# Patient Record
Sex: MALE | Race: WHITE | Employment: PART TIME | URBAN - METROPOLITAN AREA
[De-identification: names, ages, dates, MRNs, and addresses within clinical notes are randomized per-mention and may not be internally consistent; named-entity substitution may affect disease eponyms.]

---

## 2017-09-18 ENCOUNTER — TRANSFERRED RECORDS (OUTPATIENT)
Dept: HEALTH INFORMATION MANAGEMENT | Facility: CLINIC | Age: 32
End: 2017-09-18

## 2017-09-19 ENCOUNTER — TRANSFERRED RECORDS (OUTPATIENT)
Dept: HEALTH INFORMATION MANAGEMENT | Facility: CLINIC | Age: 32
End: 2017-09-19

## 2017-09-20 ENCOUNTER — TRANSFERRED RECORDS (OUTPATIENT)
Dept: HEALTH INFORMATION MANAGEMENT | Facility: CLINIC | Age: 32
End: 2017-09-20

## 2017-09-22 ENCOUNTER — TRANSFERRED RECORDS (OUTPATIENT)
Dept: HEALTH INFORMATION MANAGEMENT | Facility: CLINIC | Age: 32
End: 2017-09-22

## 2017-09-27 ENCOUNTER — TELEPHONE (OUTPATIENT)
Dept: TRANSPLANT | Facility: CLINIC | Age: 32
End: 2017-09-27

## 2017-09-27 ENCOUNTER — APPOINTMENT (OUTPATIENT)
Dept: TRANSPLANT | Facility: CLINIC | Age: 32
End: 2017-09-27
Attending: PHYSICIAN ASSISTANT

## 2017-09-27 NOTE — LETTER
09/27/17    Harrison Lipscomb  [address] [address]          Dear Harrison,    Thank you for your interest in the Transplant Center at [facility], [facility]. We look forward to being a part your care team and assisting you through the transplant process.    As we discussed, your transplant coordinator is Lisa Jay ([phone]).  You may call your coordinator at any time with questions or concerns. Your first scheduled call will be on October 10th between 10am and Noon, and the TENTATIVE date for your evaluation is October 30th at 6:30AM.  If this needs to change, call [phone].    Please complete the following.    1. Sign up for:    [employer], your electronic medical record    [employer], the Transplant Center's website (see enclosed booklet)    You can use these tools to learn more about your transplant, communicate with your care team, and track your medical details    2. Fill out and return the enclosed forms    Authorization for Electronic Communication    Authorization to Discuss Protected Health Information    [employer] Release of Information       Best Wishes,      Solid Organ Transplant Intake  [facility], [facility]    cc: YUKI Slade MD Elizabeth A Phillips, MD

## 2017-09-28 NOTE — TELEPHONE ENCOUNTER
Irais called asking if I had any demographic INS information?    I called Justin Louie, nephrologist office and Requested Demographic page with INS info from [facility] where Harrison Lipscomb is currently inpt.

## 2017-10-03 ENCOUNTER — MEDICAL CORRESPONDENCE (OUTPATIENT)
Dept: TRANSPLANT | Facility: CLINIC | Age: 32
End: 2017-10-03

## 2017-10-09 ENCOUNTER — MEDICAL CORRESPONDENCE (OUTPATIENT)
Dept: TRANSPLANT | Facility: CLINIC | Age: 32
End: 2017-10-09

## 2017-10-10 NOTE — TELEPHONE ENCOUNTER
I asked Pamela /Julia to cancel the 10/30 block evaluation until INS.  Irais, What INS do I send his information to?    I called LM with mother to call back.    I called Carlitos father cell phone and left voice mail to call back.  I called Justin Louie [phone]  Called to verify where Mr. Harrison Lipscomb referred for kidney transplant, [place] or [facility].  Does Dr. Louie feel he is a transplant candidate?       Pattie Mother and I talked,  Rubi Anthony MD  New Nephrologist [place] Kid[facility] Disease HTN [address] [address] [phone]   Friday 13, 2017 first appt  Contacted patient and introduced myself as their Transplant Coordinator, also introduced the role of the Transplant Coordinator in the transplant process.  Explained the purpose of this call including reviewing next steps and answering questions.    Confirmed Referring Provider, Dialysis Center, and Primary Care Physician. Notified patient of the importance of continued communication with referring providers and primary care physicians.    Reviewed components of transplant evaluation process including necessary appointments, tests, and procedures.    Answered questions for patient regarding evaluation, provided my name and contact information and requested they call with any additional questions.    Determined that patient would like additional information regarding transplant by:  Mat, Phone Call Encourage Mat   Notified mother Pattie,  that they will talk with INS  Irais Lira first to determine coverage at [facility] first.  Once Irais give authorization, Lisa to Place Epic Block kidney records then Julia a Transplant  to schedule evaluation for November 1, 2017.    Lisa called Urology triage spoke with CHARO Atkinson  to attempt appt with urologist week of October 30-November 3rd for second opinion.  [place] Records and [facility]

## 2017-10-11 ENCOUNTER — PRE VISIT (OUTPATIENT)
Dept: UROLOGY | Facility: CLINIC | Age: 32
End: 2017-10-11

## 2017-10-11 DIAGNOSIS — N18.5 CHRONIC KIDNEY DISEASE (CKD), STAGE V (H): Primary | ICD-10-CM

## 2017-10-11 DIAGNOSIS — Z76.82 ORGAN TRANSPLANT CANDIDATE: ICD-10-CM

## 2017-10-12 NOTE — TELEPHONE ENCOUNTER
Called pt lvm that I was scheduling kidney eval for Wed 11/1 slot 4 w/Chris/Matas, UPSing eval out tonight.  Urol appt is sched for 11/2 at 720am w/C Weight

## 2017-10-20 ENCOUNTER — PRE VISIT (OUTPATIENT)
Dept: UROLOGY | Facility: CLINIC | Age: 32
End: 2017-10-20

## 2017-10-24 ENCOUNTER — TELEPHONE (OUTPATIENT)
Dept: TRANSPLANT | Facility: CLINIC | Age: 32
End: 2017-10-24

## 2017-10-24 NOTE — TELEPHONE ENCOUNTER
Pattie Lipscomb, Mom called to confirm appts in Epic.  I forwarded VM to Julia as joaquín.  How can Pattie/ Mom verify the appts on line in Epic?  I call her back and informed her the scheduled was mailed out via UPS and did she /he receive it?  joaquín Dumont

## 2017-10-30 ENCOUNTER — TELEPHONE (OUTPATIENT)
Dept: TRANSPLANT | Facility: CLINIC | Age: 32
End: 2017-10-30

## 2017-10-30 NOTE — TELEPHONE ENCOUNTER
Harrison [place]Ayaka in [place] three appts locally in [place] this week, Too taxing for Harrison to come to [facility] at this time.  Harrison /Ayaka canceling Block eval for 11/1 & 11/2.   Ayaka, Harrison's mother, to call when they are ready to reschedule.  cruzito Tran, Benjamin Roche MD;Luther Angulo Samy Riad, MD, Irais Tran to cancel appts.  [place] in Urology got ok from Dr. Alethea gonzalez to see pt this week.  Chelsea please inform [place], thanks and unfortanately they cancelled.

## 2018-01-03 ENCOUNTER — TELEPHONE (OUTPATIENT)
Dept: TRANSPLANT | Facility: CLINIC | Age: 33
End: 2018-01-03

## 2018-01-03 NOTE — TELEPHONE ENCOUNTER
1/2/2108  Chelsea,  I called and reached person/Melisa /mother  and 'they' will call us in 3-6 months when they are ready.   I offered to close the file, but sounded like Harrison's mother asking to keep his case open, but they will call us. You are supported for not calling, we will await their call to arrange the evaluation. Lisa       From: Chelsea Portillo   Sent: 1/3/2018  12:29 PM   To: Lisa Jay, CHARO  Subject:  Evaluation   Chelsea Called pt at home number wouldn't let me leave a voice message, I than tried cell phone and that also will not take a message.  I quit on this pt.  ----- Message -----     From: Lisa Jay RN Sent: 12/14/2017   1:14 PM To: Chelsea Portillo  Subject: Patient willing to come?   Jesse Ho,  Previous note in Epic Harrison was not well enough to come to clinic appt.  Would you be willing to call him and recheck if he is willing to come to have block.  Lisa Terry /Ayaka in [place] three appts locally in [place] this week, Too taxing for Harrison to come to [facility] at this time.  Harrison /Ayaka canceling Block eval for 11/1 & 11/2.   Harrison Marley's mother, to call when they are ready to reschedule.  cruzito Tran, Benjamin Roche MD;Luther Angulo, Mao Perez MD, Irais Tran to cancel appts.  [place] in Urology got ok from Dr. Alethea gonzalez to see pt this week.  Chelsea please inform [place], thanks and unfortanately they cancelled.    Created by  Lisa Jay, RN on 10/30/2017 11:10 AM

## 2018-08-13 NOTE — TELEPHONE ENCOUNTER
"$ case review: \"This patient's insurance is an HMO Medicare Advantage Plan from [place] where they live and I believe are already listed.   If the mom/patient call again with a new referral and indicate they want to pursue transplant here - $  will review in future. \"  The referral is closed/ended.   Referral ended. Reason:  We were unable to contact patient  Episode resolved 8/13/2018  Note to Irais   "

## 2018-11-27 ENCOUNTER — TELEPHONE (OUTPATIENT)
Dept: TRANSPLANT | Facility: CLINIC | Age: 33
End: 2018-11-27

## 2018-11-27 NOTE — TELEPHONE ENCOUNTER
Mother Melisa called left a voice message to state Harrison Lipscomb is ready to do a kidney evaluation.  She asked for a call back.    I avani called and LM with Melisa asking Harrison or Melisa to call int INTAKE staff at [phone] option [phone] to restart the process for Kidney transplant

## 2018-11-28 ENCOUNTER — TELEPHONE (OUTPATIENT)
Dept: TRANSPLANT | Facility: CLINIC | Age: 33
End: 2018-11-28

## 2018-11-28 NOTE — TELEPHONE ENCOUNTER
Patient Call: General    Reason for call: mother of the patient calling SOT needs the intake process started.    Call back needed? Yes    Return Call Needed  Same as documented in contacts section  When to return call?: Greater than one day: Route standard priority

## 2018-12-19 ENCOUNTER — TELEPHONE (OUTPATIENT)
Dept: TRANSPLANT | Facility: CLINIC | Age: 33
End: 2018-12-19

## 2018-12-19 NOTE — TELEPHONE ENCOUNTER
Harrison Vincent's mother called to discuss INS.     Harrison has [employer] Medicare plan through 12/31/2018 then [employer] 1/1/2019.     Harrison reportedly has five live willing kidney donors in [place] and would like to start the Evaluation process as soon as possible.     I informed Melisa that Jax Hamptonanan is $  and he will want the INS information and once INS is secured from [facility] I can call Harrison to start the evaluation process.    Melisa has made multiple calls and glad to find the answers on how to do the process.      Melisa states she has worked with other programs and I/ we at [facility] have been curteous, helpful and knowledgeable.  She appreciates the care. I thanked her for the positive comments.     I gave Ms. Deisi Camara's number and connected her with him or his voice mail to discuss INS.

## 2019-01-07 ENCOUNTER — TELEPHONE (OUTPATIENT)
Dept: TRANSPLANT | Facility: CLINIC | Age: 34
End: 2019-01-07

## 2019-01-07 NOTE — TELEPHONE ENCOUNTER
Melisa mother called back to discuss the INS and evaluation at [facility] and potential dates to come on M orW.     I reviewed Jax Bass's OK for EVAL;  Lisa will need to send full records after EVAL to [place] for PA.      Pt is on dialysis it was NOT listed /entered into the epic snapshot page by INTAKE.    Pt dialyzes in [facility]  At [facility] unit.    Mom gave the following name, address and phone number.     [facility]  [address] [address]  [facility] [address]  Phone [phone]  [phone]    Start DATE of Dialysis  10/28/2016   Dialysis unit to fax  [phone] form attn Lisa     3X week currently  T, Th, Sa,     [phone] Harrison     Dialysis Manager Nahomy Bangtt to call INTAKE staff to start the referral.  Harrison to give the INTAKE Staff ok to speak with Mom for cares  2/13 Date for Evaluation given to Pattie ( need to confirm and write orders after intake staff call completed.  Pattie aware.Harrison, Pattie and Lisa to have three way conversation.  Harrison lives in [place] and is in chronic discomfort with dialysis.    Pattie and Harrison to discuss symptoms with their dialysis manager and nephrologist.     Lisa to talk with Harrison and Mom Pattie Santanagisel. Contacted patient and introduced myself as their Transplant Coordinator, also introduced the role of the Transplant Coordinator in the transplant process.  Explained the purpose of this call including reviewing next steps and answering questions.    Confirmed Referring Provider, Dialysis Center, and Primary Care Physician. Notified patient of the importance of continued communication with referring providers and primary care physicians.    Reviewed components of transplant evaluation process including necessary appointments, tests, and procedures.    Answered questions for patient regarding evaluation, provided my name and contact information and requested they call with any additional questions.    Determined that  patient would like additional information regarding transplant by:  Drop Down choices: Mail, Email, MyChart, Phone Call Encourage MyChart   Notified patients that they will hear from a Transplant  to schedule evaluation.    KASIA 2/13/2019 Wednesday

## 2019-01-14 ENCOUNTER — DOCUMENTATION ONLY (OUTPATIENT)
Dept: TRANSPLANT | Facility: CLINIC | Age: 34
End: 2019-01-14

## 2019-01-16 DIAGNOSIS — Z76.82 ORGAN TRANSPLANT CANDIDATE: ICD-10-CM

## 2019-01-16 DIAGNOSIS — N18.6 END STAGE RENAL DISEASE (H): ICD-10-CM

## 2019-01-21 ENCOUNTER — DOCUMENTATION ONLY (OUTPATIENT)
Dept: TRANSPLANT | Facility: CLINIC | Age: 34
End: 2019-01-21

## 2019-01-29 ENCOUNTER — DOCUMENTATION ONLY (OUTPATIENT)
Dept: TRANSPLANT | Facility: CLINIC | Age: 34
End: 2019-01-29

## 2019-01-29 NOTE — PROGRESS NOTES
Harrison Lipscomb signed [facility] evaluation ROXY and I sent to admin staff to request evaluation from [place] either [place] or in [place].     Marilee has the ROXY and will send to [facility] scanning after sending request for records.

## 2019-02-12 ENCOUNTER — TELEPHONE (OUTPATIENT)
Dept: TRANSPLANT | Facility: CLINIC | Age: 34
End: 2019-02-12

## 2019-02-12 NOTE — TELEPHONE ENCOUNTER
Melisa Delmonica called from [place] asking about their [employer] flight and evaluation for 2/13/2019.    Ricardo offering to pay for flight later.  I suggested she/he take the offer as the conditions in [facility] are snowy and more to come this afternoon.    Melisa will call Chelsea to reschedule at later time.    NILO PKE to be cancelled for 2/13/2019, due to weather and flight from [place] to [place] concerns.     Luther John, Aziza Gomez Candace.

## 2019-02-21 NOTE — TELEPHONE ENCOUNTER
Called patient to reschedule kidney eval, he stated that he was going to go to [facility] for eval and he didn't think he would be coming here.

## 2019-03-18 ENCOUNTER — APPOINTMENT (RX ONLY)
Dept: URBAN - METROPOLITAN AREA CLINIC 167 | Facility: CLINIC | Age: 34
Setting detail: DERMATOLOGY
End: 2019-03-18

## 2019-03-18 DIAGNOSIS — B07.8 OTHER VIRAL WARTS: ICD-10-CM

## 2019-03-18 PROBLEM — D48.5 NEOPLASM OF UNCERTAIN BEHAVIOR OF SKIN: Status: ACTIVE | Noted: 2019-03-18

## 2019-03-18 PROBLEM — Z92.3 PERSONAL HISTORY OF IRRADIATION: Status: ACTIVE | Noted: 2019-03-18

## 2019-03-18 PROCEDURE — 11102 TANGNTL BX SKIN SINGLE LES: CPT | Mod: 59

## 2019-03-18 PROCEDURE — 17110 DESTRUCTION B9 LES UP TO 14: CPT

## 2019-03-18 PROCEDURE — 11103 TANGNTL BX SKIN EA SEP/ADDL: CPT

## 2019-03-18 PROCEDURE — ? BIOPSY BY SHAVE METHOD

## 2019-03-18 PROCEDURE — ? LIQUID NITROGEN

## 2019-03-18 ASSESSMENT — LOCATION ZONE DERM
LOCATION ZONE: TRUNK
LOCATION ZONE: FEET

## 2019-03-18 ASSESSMENT — LOCATION SIMPLE DESCRIPTION DERM
LOCATION SIMPLE: RIGHT PLANTAR SURFACE
LOCATION SIMPLE: LOWER BACK
LOCATION SIMPLE: LEFT LOWER BACK

## 2019-03-18 ASSESSMENT — LOCATION DETAILED DESCRIPTION DERM
LOCATION DETAILED: LEFT INFERIOR MEDIAL LOWER BACK
LOCATION DETAILED: INFERIOR LUMBAR SPINE
LOCATION DETAILED: RIGHT PLANTAR FOREFOOT OVERLYING 3RD METATARSAL

## 2019-03-18 NOTE — HPI: SKIN LESION
What Type Of Note Output Would You Prefer (Optional)?: Standard Output
How Severe Is Your Skin Lesion?: mild
Has Your Skin Lesion Been Treated?: not been treated
Is This A New Presentation, Or A Follow-Up?: Skin Lesion
Additional History: Patient has been using hydrocortisone, triamcinolone, and clotrimazole cream and states helps a little bit.
Is This A New Presentation, Or A Follow-Up?: Growths

## 2019-03-18 NOTE — PROCEDURE: LIQUID NITROGEN
Render Post-Care Instructions In Note?: no
Include Z78.9 (Other Specified Conditions Influencing Health Status) As An Associated Diagnosis?: Yes
Detail Level: Simple
Post-Care Instructions: I reviewed with the patient in detail post-care instructions. Patient is to wear sunprotection, and avoid picking at any of the treated lesions. Pt may apply Vaseline to crusted or scabbing areas.
Consent: The patient's consent was obtained including but not limited to risks of crusting, scabbing, blistering, scarring, darker or lighter pigmentary change, recurrence, incomplete removal and infection.
Medical Necessity Clause: This procedure was medically necessary because the lesions that were treated were:irritated by picking, bra
Medical Necessity Information: It is in your best interest to select a reason for this procedure from the list below. All of these items fulfill various CMS LCD requirements except the new and changing color options.
Number Of Freeze-Thaw Cycles: 1 freeze-thaw cycle

## 2019-03-18 NOTE — PROCEDURE: BIOPSY BY SHAVE METHOD
Hemostasis: Pressure
Notification Instructions: Patient will be notified of biopsy results. However, patient instructed to call the office if not contacted within 2 weeks.
Silver Nitrate Text: The wound bed was treated with silver nitrate after the biopsy was performed.
Dressing: bandage
Post-Care Instructions: I reviewed with the patient in detail post-care instructions. Patient is to keep the biopsy site dry overnight, and then apply bacitracin twice daily until healed. Patient may apply hydrogen peroxide soaks to remove any crusting.
Detail Level: Detailed
Biopsy Method: double edge Personna blade
Lab: [id number]
Depth Of Biopsy: dermis
Consent: Written consent was obtained and risks were reviewed including but not limited to scarring, infection, bleeding, scabbing, incomplete removal, nerve damage and allergy to anesthesia.
Bill For Surgical Tray: no
Size Of Lesion In Cm: 2.5
Wound Care: Vaseline
Biopsy Type: H and E
Additional Anesthesia Volume In Cc (Will Not Render If 0): 0
Cryotherapy Text: The wound bed was treated with cryotherapy after the biopsy was performed.
Lab Facility: [id number]
Anesthesia Volume In Cc (Will Not Render If 0): 0.5
Type Of Destruction Used: Curettage
Electrodesiccation And Curettage Text: The wound bed was treated with electrodesiccation and curettage after the biopsy was performed.
Was A Bandage Applied: Yes
Billing Type: Third-Party Bill
Electrodesiccation Text: The wound bed was treated with electrodesiccation after the biopsy was performed.
Lab Facility: [id number]
Billing Type: Third-Party Bill
Lab: [id number]
Size Of Lesion In Cm: 0.4

## 2019-03-27 ENCOUNTER — APPOINTMENT (RX ONLY)
Dept: URBAN - METROPOLITAN AREA CLINIC 167 | Facility: CLINIC | Age: 34
Setting detail: DERMATOLOGY
End: 2019-03-27

## 2019-03-27 DIAGNOSIS — B07.8 OTHER VIRAL WARTS: ICD-10-CM

## 2019-03-27 DIAGNOSIS — Z48.817 ENCOUNTER FOR SURGICAL AFTERCARE FOLLOWING SURGERY ON THE SKIN AND SUBCUTANEOUS TISSUE: ICD-10-CM

## 2019-03-27 DIAGNOSIS — L29.8 OTHER PRURITUS: ICD-10-CM

## 2019-03-27 DIAGNOSIS — L29.89 OTHER PRURITUS: ICD-10-CM

## 2019-03-27 PROBLEM — C44.519 BASAL CELL CARCINOMA OF SKIN OF OTHER PART OF TRUNK: Status: ACTIVE | Noted: 2019-03-27

## 2019-03-27 PROBLEM — Z85.828 PERSONAL HISTORY OF OTHER MALIGNANT NEOPLASM OF SKIN: Status: ACTIVE | Noted: 2019-03-27

## 2019-03-27 PROCEDURE — ? LIQUID NITROGEN

## 2019-03-27 PROCEDURE — ? DEFER

## 2019-03-27 PROCEDURE — 17110 DESTRUCTION B9 LES UP TO 14: CPT | Mod: 79

## 2019-03-27 PROCEDURE — 99212 OFFICE O/P EST SF 10 MIN: CPT | Mod: 24,25

## 2019-03-27 PROCEDURE — ? TREATMENT REGIMEN

## 2019-03-27 PROCEDURE — ? COUNSELING

## 2019-03-27 PROCEDURE — ? POST-OP WOUND CHECK

## 2019-03-27 ASSESSMENT — LOCATION SIMPLE DESCRIPTION DERM
LOCATION SIMPLE: RIGHT PLANTAR SURFACE
LOCATION SIMPLE: LEFT BUTTOCK
LOCATION SIMPLE: LOWER BACK
LOCATION SIMPLE: LEFT LOWER BACK

## 2019-03-27 ASSESSMENT — LOCATION DETAILED DESCRIPTION DERM
LOCATION DETAILED: RIGHT PLANTAR FOREFOOT OVERLYING 3RD METATARSAL
LOCATION DETAILED: LEFT INFERIOR MEDIAL LOWER BACK
LOCATION DETAILED: LEFT SUPERIOR MEDIAL LOWER BACK
LOCATION DETAILED: LEFT MEDIAL BUTTOCK
LOCATION DETAILED: INFERIOR LUMBAR SPINE

## 2019-03-27 ASSESSMENT — LOCATION ZONE DERM
LOCATION ZONE: FEET
LOCATION ZONE: TRUNK

## 2019-03-27 ASSESSMENT — PAIN INTENSITY VAS: HOW INTENSE IS YOUR PAIN 0 BEING NO PAIN, 10 BEING THE MOST SEVERE PAIN POSSIBLE?: 1/10 PAIN

## 2019-03-27 NOTE — PROCEDURE: LIQUID NITROGEN
Detail Level: Simple
Post-Care Instructions: I reviewed with the patient in detail post-care instructions. Patient is to wear sunprotection, and avoid picking at any of the treated lesions. Pt may apply Vaseline to crusted or scabbing areas.
Medical Necessity Information: It is in your best interest to select a reason for this procedure from the list below. All of these items fulfill various CMS LCD requirements except the new and changing color options.
Consent: The patient's consent was obtained including but not limited to risks of crusting, scabbing, blistering, scarring, darker or lighter pigmentary change, recurrence, incomplete removal and infection.
Add 52 Modifier (Optional): no
Include Z78.9 (Other Specified Conditions Influencing Health Status) As An Associated Diagnosis?: Yes
Medical Necessity Clause: This procedure was medically necessary because the lesions that were treated were:irritated by picking, bra
Number Of Freeze-Thaw Cycles: 1 freeze-thaw cycle

## 2019-03-27 NOTE — PROCEDURE: POST-OP WOUND CHECK
Add 76159 Cpt? (Important Note: In 2017 The Use Of 89296 Is Being Tracked By Cms To Determine Future Global Period Reimbursement For Global Periods): no
Detail Level: Detailed
Wound Evaluated By: Pasquale Dunbar M.D.

## 2019-03-27 NOTE — PROCEDURE: TREATMENT REGIMEN
Continue Regimen: Gabapentin - has prescription at home\\nDaily antihistamine
Detail Level: Zone
Plan: Patient does not want to continue gabapentin due to side effect of suicidal tendencies

## 2019-03-27 NOTE — PROCEDURE: DEFER
Reason To Defer Override: He wants to treat the area with DMSO, I do not recommend this treatment as this may just delay definitive treatment
Instructions (Optional): Follow up with Lauri Cortez M.D.
Procedure To Be Performed At Next Visit: Excision
Introduction Text (Please End With A Colon): The following procedure was deferred:
Detail Level: Detailed

## 2019-10-01 ENCOUNTER — HEALTH MAINTENANCE LETTER (OUTPATIENT)
Age: 34
End: 2019-10-01

## 2019-12-15 ENCOUNTER — HEALTH MAINTENANCE LETTER (OUTPATIENT)
Age: 34
End: 2019-12-15

## 2020-03-22 ENCOUNTER — HEALTH MAINTENANCE LETTER (OUTPATIENT)
Age: 35
End: 2020-03-22

## 2021-01-15 ENCOUNTER — HEALTH MAINTENANCE LETTER (OUTPATIENT)
Age: 36
End: 2021-01-15

## 2021-05-09 ENCOUNTER — HEALTH MAINTENANCE LETTER (OUTPATIENT)
Age: 36
End: 2021-05-09

## 2021-10-24 ENCOUNTER — HEALTH MAINTENANCE LETTER (OUTPATIENT)
Age: 36
End: 2021-10-24

## 2022-06-05 ENCOUNTER — HEALTH MAINTENANCE LETTER (OUTPATIENT)
Age: 37
End: 2022-06-05

## 2022-10-16 ENCOUNTER — HEALTH MAINTENANCE LETTER (OUTPATIENT)
Age: 37
End: 2022-10-16

## 2023-06-14 NOTE — TELEPHONE ENCOUNTER
Called pt at home number wouldn't let me leave a voice message, I than tried cell phone and that also will not take a message.  I quit on this pt.   Patient requests all Lab, Cardiology, and Radiology Results on their Discharge Instructions

## 2023-06-17 ENCOUNTER — HEALTH MAINTENANCE LETTER (OUTPATIENT)
Age: 38
End: 2023-06-17